# Patient Record
Sex: FEMALE | Race: WHITE | NOT HISPANIC OR LATINO | Employment: OTHER | URBAN - METROPOLITAN AREA
[De-identification: names, ages, dates, MRNs, and addresses within clinical notes are randomized per-mention and may not be internally consistent; named-entity substitution may affect disease eponyms.]

---

## 2017-01-12 ENCOUNTER — OFFICE VISIT (OUTPATIENT)
Dept: INTERNAL MEDICINE | Facility: CLINIC | Age: 82
End: 2017-01-12

## 2017-01-12 VITALS
DIASTOLIC BLOOD PRESSURE: 80 MMHG | TEMPERATURE: 98.7 F | WEIGHT: 209 LBS | HEIGHT: 68 IN | BODY MASS INDEX: 31.67 KG/M2 | SYSTOLIC BLOOD PRESSURE: 120 MMHG

## 2017-01-12 DIAGNOSIS — K76.89 LIVER CYST: Primary | ICD-10-CM

## 2017-01-12 DIAGNOSIS — F41.1 ANXIETY STATE: ICD-10-CM

## 2017-01-12 DIAGNOSIS — E78.5 HYPERLIPIDEMIA, UNSPECIFIED HYPERLIPIDEMIA TYPE: ICD-10-CM

## 2017-01-12 DIAGNOSIS — M79.606 PAIN OF LOWER EXTREMITY, UNSPECIFIED LATERALITY: ICD-10-CM

## 2017-01-12 DIAGNOSIS — G89.29 CHRONIC LEFT SHOULDER PAIN: ICD-10-CM

## 2017-01-12 DIAGNOSIS — Z13.820 ENCOUNTER FOR SCREENING FOR OSTEOPOROSIS: ICD-10-CM

## 2017-01-12 DIAGNOSIS — M25.512 CHRONIC LEFT SHOULDER PAIN: ICD-10-CM

## 2017-01-12 DIAGNOSIS — I10 BENIGN ESSENTIAL HYPERTENSION: ICD-10-CM

## 2017-01-12 DIAGNOSIS — M25.50 ARTHRALGIA, UNSPECIFIED JOINT: ICD-10-CM

## 2017-01-12 PROCEDURE — 99214 OFFICE O/P EST MOD 30 MIN: CPT | Performed by: NURSE PRACTITIONER

## 2017-01-12 RX ORDER — HYDROCODONE BITARTRATE AND ACETAMINOPHEN 7.5; 325 MG/1; MG/1
1 TABLET ORAL 3 TIMES DAILY PRN
Qty: 90 TABLET | Refills: 0 | Status: SHIPPED | OUTPATIENT
Start: 2017-01-12

## 2017-01-12 RX ORDER — VENLAFAXINE HYDROCHLORIDE 150 MG/1
CAPSULE, EXTENDED RELEASE ORAL
Qty: 90 CAPSULE | Refills: 1 | Status: SHIPPED | OUTPATIENT
Start: 2017-01-12

## 2017-01-12 RX ORDER — IBANDRONATE SODIUM 150 MG/1
150 TABLET, FILM COATED ORAL
Qty: 4 TABLET | Refills: 1 | Status: SHIPPED | OUTPATIENT
Start: 2017-01-12

## 2017-01-12 RX ORDER — PRAVASTATIN SODIUM 40 MG
40 TABLET ORAL DAILY
Qty: 30 TABLET | Refills: 5 | Status: SHIPPED | OUTPATIENT
Start: 2017-01-12 | End: 2017-01-12 | Stop reason: SDUPTHER

## 2017-01-12 RX ORDER — VENLAFAXINE HYDROCHLORIDE 150 MG/1
150 CAPSULE, EXTENDED RELEASE ORAL DAILY
Qty: 30 CAPSULE | Refills: 5 | Status: SHIPPED | OUTPATIENT
Start: 2017-01-12 | End: 2017-01-12 | Stop reason: SDUPTHER

## 2017-01-12 RX ORDER — PRAVASTATIN SODIUM 40 MG
TABLET ORAL
Qty: 90 TABLET | Refills: 1 | Status: SHIPPED | OUTPATIENT
Start: 2017-01-12
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
## 2017-01-12 NOTE — MR AVS SNAPSHOT
Gabriella Palmer   1/12/2017 1:00 PM   Office Visit    Dept Phone:  [phone]   Encounter #:  [id number]    Provider:  FRANCE Herndon   Department:  [facility] INTERNAL MEDICINE                Your Full Care Plan              Today's Medication Changes          These changes are accurate as of: 1/12/17  2:15 PM.  If you have any questions, ask your nurse or doctor.               New Medication(s)Ordered:     pravastatin 40 MG tablet   Commonly known as:  PRAVACHOL   Take 1 tablet by mouth Daily.   Started by:  FRANCE Herndon         Medication(s)that have changed:     HYDROcodone-acetaminophen 7.5-325 MG per tablet   Commonly known as:  NORCO   Take 1 tablet by mouth 3 (Three) Times a Day As Needed for moderate pain (4-6).   What changed:  See the new instructions.   Changed by:  FRANCE Herndon            Where to Get Your Medications      These medications were sent to [facility] [address] [place] [address] [address] AT [facility] [phone] [facility] [phone]   [address] [facility] [address]     Phone:  [phone]     ibandronate 150 MG tablet    pravastatin 40 MG tablet    venlafaxine  MG 24 hr capsule         You can get these medications from any pharmacy     Bring a paper prescription for each of these medications     HYDROcodone-acetaminophen 7.5-325 MG per tablet                  Your Updated Medication List          This list is accurate as of: 1/12/17  2:15 PM.  Always use your most recent med list.                clonazePAM 0.5 MG tablet   Commonly known as:  KlonoPIN   TAKE 1 TABLET BY MOUTH EVERY MORNING AND THEN TAKE 2 TABLETS BY MOUTH EVERY EVENING AS NEEDED       DILT- MG 24 hr capsule   Generic drug:  diltiazem XR       HYDROcodone-acetaminophen 7.5-325 MG per tablet   Commonly known as:  NORCO   Take 1 tablet by mouth 3 (Three) Times a Day As Needed  for moderate pain (4-6).       ibandronate 150 MG tablet   Commonly known as:  BONIVA   Take 1 tablet by mouth Every 30 (Thirty) Days.       metoprolol tartrate 25 MG tablet   Commonly known as:  LOPRESSOR       MULTI VITAMIN DAILY PO       pravastatin 40 MG tablet   Commonly known as:  PRAVACHOL   Take 1 tablet by mouth Daily.       rOPINIRole 2 MG tablet   Commonly known as:  REQUIP   TAKE 1 TABLET BY MOUTH EVERY NIGHT       venlafaxine  MG 24 hr capsule   Commonly known as:  EFFEXOR-XR   Take 1 capsule by mouth Daily.       Vitamin D3 2000 UNITS tablet               We Performed the Following     DEXA Bone Density Axial     MRI abdomen w wo contrast       You Were Diagnosed With        Codes Comments    Liver cyst    -  Primary ICD-10-CM: K76.89  ICD-9-CM: 573.8     Benign essential hypertension     ICD-10-CM: I10  ICD-9-CM: 401.1     Arthralgia, unspecified joint     ICD-10-CM: M25.50  ICD-9-CM: 719.40     Pain of lower extremity, unspecified laterality     ICD-10-CM: M79.606  ICD-9-CM: 729.5     Chronic left shoulder pain     ICD-10-CM: M25.512, G89.29  ICD-9-CM: 719.41, 338.29     Anxiety state     ICD-10-CM: F41.1  ICD-9-CM: 300.00     Hyperlipidemia, unspecified hyperlipidemia type     ICD-10-CM: E78.5  ICD-9-CM: 272.4     Encounter for screening for osteoporosis     ICD-10-CM: Z13.820  ICD-9-CM: V82.81       Instructions     None    Patient Instructions History      Upcoming Appointments     Visit Type Date Time Department    FOLLOW UP 1/12/2017  1:00 PM [address]      [employer] Signup     Our records indicate that you have declined [employer] signup. If you would like to sign up for [employer], please email [email or web address] or call [phone] to obtain an activation code.             Other Info from Your Visit           Allergies     Morphine Sulfate      Ampicillin      Crestor [Rosuvastatin]      Demerol [Meperidine]      Lipitor [Atorvastatin]      Phenobarbital    "     Reason for Visit     3 month follow up     Hypertension     Hyperlipidemia     Daytime Sleepiness Discuss stopping any medication that could be making her sleepy. She sleep all the time.       Vital Signs     Blood Pressure Temperature Height Weight Body Mass Index Smoking Status    120/80 98.7 °F (37.1 °C) (Temporal Artery ) 68\" (172.7 cm) 209 lb (94.8 kg) 31.78 kg/m2 Never Smoker      Problems and Diagnoses Noted     Anxiety state    Benign essential hypertension    Chronic left shoulder pain    High cholesterol or triglycerides    Liver cyst    Joint pain    Leg pain    Screening for osteoporosis          No Longer an Issue     Cough        "

## 2017-01-23 ENCOUNTER — HOSPITAL ENCOUNTER (OUTPATIENT)
Dept: MRI IMAGING | Facility: HOSPITAL | Age: 82
Discharge: HOME | End: 2017-01-23
Admitting: NURSE PRACTITIONER

## 2017-01-23 LAB — CREAT BLDA-MCNC: 1 MG/DL (ref 0.6–1.3)

## 2017-01-23 PROCEDURE — 82565 ASSAY OF CREATININE: CPT

## 2017-01-23 PROCEDURE — A9577 INJ MULTIHANCE: HCPCS | Performed by: NURSE PRACTITIONER

## 2017-01-23 PROCEDURE — 0 GADOBENATE DIMEGLUMINE 529 MG/ML SOLUTION: Performed by: NURSE PRACTITIONER

## 2017-01-23 PROCEDURE — 74183 MRI ABD W/O CNTR FLWD CNTR: CPT

## 2017-01-23 RX ADMIN — GADOBENATE DIMEGLUMINE 20 ML: 529 INJECTION, SOLUTION INTRAVENOUS at 14:00

## 2017-01-24 ENCOUNTER — HOSPITAL ENCOUNTER (OUTPATIENT)
Dept: BONE DENSITY | Facility: HOSPITAL | Age: 82
Discharge: HOME | End: 2017-01-24
Admitting: NURSE PRACTITIONER

## 2017-01-24 PROCEDURE — 77080 DXA BONE DENSITY AXIAL: CPT

## 2017-01-24 PROCEDURE — 77080 DXA BONE DENSITY AXIAL: CPT | Performed by: RADIOLOGY

## 2017-01-25 ENCOUNTER — TELEPHONE (OUTPATIENT)
Dept: INTERNAL MEDICINE | Facility: CLINIC | Age: 82
End: 2017-01-25

## 2017-01-25 NOTE — TELEPHONE ENCOUNTER
----- Message from FRANCE Herndon sent at 1/24/2017  9:17 AM EST -----  MRI of abdomen shows a benign/simple cyst in the liver.  Nothing else to do unless she starts to have abdominal pain/bloating, etc.

## 2017-01-27 ENCOUNTER — TELEPHONE (OUTPATIENT)
Dept: INTERNAL MEDICINE | Facility: CLINIC | Age: 82
End: 2017-01-27

## 2017-01-27 RX ORDER — ROPINIROLE 2 MG/1
TABLET, FILM COATED ORAL
Qty: 90 TABLET | Refills: 0 | Status: SHIPPED | OUTPATIENT
Start: 2017-01-27 | End: 2017-06-12 | Stop reason: SDUPTHER

## 2017-01-27 NOTE — TELEPHONE ENCOUNTER
----- Message from Gloria Alaniz sent at 1/27/2017 11:21 AM EST -----  PT CALLED [facility] TO KNOW SHE HAS BEEN SLEEPING EVERY OTHER NIGHT.

## 2017-01-27 NOTE — TELEPHONE ENCOUNTER
Olga called back and Chanell gave her the results msg and told her that she is doing okay, but she also wanted to let you know that she is only sleeping every other night.

## 2017-02-02 ENCOUNTER — TELEPHONE (OUTPATIENT)
Dept: INTERNAL MEDICINE | Facility: CLINIC | Age: 82
End: 2017-02-02

## 2017-02-02 NOTE — TELEPHONE ENCOUNTER
----- Message from Gloria Alaniz sent at 2/2/2017 11:02 AM EST -----  PLEASE GIVE PT A CALL ABOUT INS PAPERWORK SHE BROUGHT IN WANTS TO KNOW WHAT SHE NEEDS TO PAY.

## 2017-02-02 NOTE — TELEPHONE ENCOUNTER
I have called Olga to let her know that I have left a msg to the [facility] billing department and asked them to please call me back in regards to her bill.

## 2017-02-02 NOTE — TELEPHONE ENCOUNTER
Have called the billing department at [phone] [facility] and left them a msg for them to call me back in regards to patient bill for her oxygen and to see what we need to do or can do to get her oxygen paid for. Pt's account # is [id number].

## 2017-02-02 NOTE — TELEPHONE ENCOUNTER
I have spoken with Olga and she does want to continue to have her oxygen on hand in case she needs it. I told her I will call  The billing office at [facility] and check to see what all they need from us as far as a face to face visit.

## 2017-02-08 NOTE — TELEPHONE ENCOUNTER
Left calli a msg letting her know that I spoke with Willa at [facility] and they do need us to do a face to face with her for her oxygen and submitted those records to them so they can get that run through her insurance which would help get her oxygen paid for. I told her to call the office back to schedule appt and of course call me back if she has any questions.

## 2017-02-09 ENCOUNTER — OFFICE VISIT (OUTPATIENT)
Dept: INTERNAL MEDICINE | Facility: CLINIC | Age: 82
End: 2017-02-09

## 2017-02-09 VITALS
WEIGHT: 212 LBS | DIASTOLIC BLOOD PRESSURE: 80 MMHG | BODY MASS INDEX: 32.13 KG/M2 | TEMPERATURE: 97.6 F | HEIGHT: 68 IN | HEART RATE: 94 BPM | SYSTOLIC BLOOD PRESSURE: 120 MMHG | OXYGEN SATURATION: 97 %

## 2017-02-09 DIAGNOSIS — R06.89 DYSPNEA AND RESPIRATORY ABNORMALITIES: ICD-10-CM

## 2017-02-09 DIAGNOSIS — J44.9 CHRONIC OBSTRUCTIVE PULMONARY DISEASE, UNSPECIFIED COPD TYPE (HCC): ICD-10-CM

## 2017-02-09 DIAGNOSIS — R06.00 DYSPNEA AND RESPIRATORY ABNORMALITIES: ICD-10-CM

## 2017-02-09 DIAGNOSIS — Z79.899 ENCOUNTER FOR LONG-TERM (CURRENT) USE OF MEDICATIONS: Primary | ICD-10-CM

## 2017-02-09 PROCEDURE — 99214 OFFICE O/P EST MOD 30 MIN: CPT | Performed by: NURSE PRACTITIONER

## 2017-02-09 RX ORDER — DILTIAZEM HYDROCHLORIDE 300 MG/1
CAPSULE, EXTENDED RELEASE ORAL
COMMUNITY
Start: 2017-01-24
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
## 2017-03-15 ENCOUNTER — TELEPHONE (OUTPATIENT)
Dept: INTERNAL MEDICINE | Facility: CLINIC | Age: 82
End: 2017-03-15

## 2017-03-15 NOTE — TELEPHONE ENCOUNTER
----- Message from Holly Meade sent at 3/15/2017 12:01 PM EDT -----  Contact: patient  Would like a call back

## 2017-03-21 ENCOUNTER — TRANSCRIBE ORDERS (OUTPATIENT)
Dept: ADMINISTRATIVE | Facility: HOSPITAL | Age: 82
End: 2017-03-21

## 2017-03-21 DIAGNOSIS — Z12.31 VISIT FOR SCREENING MAMMOGRAM: Primary | ICD-10-CM

## 2017-03-22 ENCOUNTER — TELEPHONE (OUTPATIENT)
Dept: INTERNAL MEDICINE | Facility: CLINIC | Age: 82
End: 2017-03-22

## 2017-03-22 NOTE — TELEPHONE ENCOUNTER
We have the bill she dropped off for her Oxygen. Im still waiting for Carlee to decided how to proceed with an office note or letter sent to company in order for them to pay for her oxygen.

## 2017-03-22 NOTE — TELEPHONE ENCOUNTER
Spoke with Olga and she said that she can barley breath even with her oxygen and can't catch her breath. Having a lot of difficulty breathing and not feeling well at all. I told her if she is having this much difficulty breathing and feeling that bad then she needs to go ahead to the ER to be evaluated. She said she would go on to the ER and usually goes to [facility].

## 2017-03-23 ENCOUNTER — TELEPHONE (OUTPATIENT)
Dept: INTERNAL MEDICINE | Facility: CLINIC | Age: 82
End: 2017-03-23

## 2017-03-23 NOTE — TELEPHONE ENCOUNTER
I have left patient a msg letting her know that She needs to come back in to see Carlee for a face to face and walk test in order for her to get something sent over to the Oxygen company in order to help get her oxygen covered. I told her I was also calling to check in her since I did send her to the ER yesterday for breathing problems. I told her when she gets my msg and a chance to please call the office to schedule an appt.

## 2017-04-07 ENCOUNTER — TELEPHONE (OUTPATIENT)
Dept: INTERNAL MEDICINE | Facility: CLINIC | Age: 82
End: 2017-04-07

## 2017-04-07 NOTE — TELEPHONE ENCOUNTER
Spoke with Olga and let her know that according to Carlee last note in February that she verbalized the understanding that Carlee was not going to write for any pain medication from that point on. She said yes, she knows. I told her to make sure she keeps her appt with you on 4/13/17 next to follow up on everything.

## 2017-04-07 NOTE — TELEPHONE ENCOUNTER
"----- Message from Lary Rivera sent at 4/7/2017  1:31 PM EDT -----  Patient called to ask for an rx for hydrocodone because her shoulder hurts. I let her know that I was sending the message back, but that Carlee is out of the office this week so she wouldn't be able to address this until Monday at the earliest. I also advised her to seek care at the [facility] next door to us if she felt that she needed to be treated acutely. She told me, \"someone else over there is just going to have to write this prescription, then.\"   Again, I explained that Carlee is out of the office this week, but that she will see this message once she returns. I advised her to seek care at [facility] again. Patient stated that she would \"just figure it out\" and hung up. Please advise. Thanks  "

## 2017-04-10 ENCOUNTER — TELEPHONE (OUTPATIENT)
Dept: INTERNAL MEDICINE | Facility: CLINIC | Age: 82
End: 2017-04-10

## 2017-04-10 NOTE — TELEPHONE ENCOUNTER
"----- Message from Lary Rivera sent at 4/10/2017  1:36 PM EDT -----  Patient called back to ask about getting pain pills. I asked her if she spoke to you on Friday and she said \"NO. No one has called me at all and I need something for my shoulder.\" I then read the message chain to her that says you did discussed this with her on Friday and she expressed understanding that Carlee has already told her she would not write any more controls for her. Per Carlee's note, I told her that we could send her to pain management, but she refused stating \"If Carlee can't do this for me, then I'll call a doctor.\" Just fyi.  "

## 2017-04-10 NOTE — TELEPHONE ENCOUNTER
I will not write for any controlled pain meds.  We discussed this at her last visit due to the instability of her environment.  I can refer her to pain management if she would like?

## 2017-04-10 NOTE — TELEPHONE ENCOUNTER
----- Message from Gloria Alaniz sent at 4/10/2017 11:43 AM EDT -----  PLEASE GIVE PT A CALL SHE WANTS SOME PAIN MEDICINE HER SHOULDER IS KILLING HER SHE SAID IF YOU WONT SHE WILL CALL THE SURGEON. SHE STATES SHE WILL HID IT GOOD SO NOBOBODY WILL FIND IT.

## 2017-04-10 NOTE — TELEPHONE ENCOUNTER
Left Olga a msg letting her know that i received both msg's today and that we spoke on Friday regarding this issue and that Carlee is not going to be writing for this medication, but she did say that she would refer to pain management if she would like and to give me a call back regarding this and if she would like to proceed with a referral to Pain management.

## 2017-04-13 ENCOUNTER — OFFICE VISIT (OUTPATIENT)
Dept: INTERNAL MEDICINE | Facility: CLINIC | Age: 82
End: 2017-04-13

## 2017-04-13 VITALS
BODY MASS INDEX: 32.54 KG/M2 | DIASTOLIC BLOOD PRESSURE: 62 MMHG | HEART RATE: 80 BPM | RESPIRATION RATE: 22 BRPM | SYSTOLIC BLOOD PRESSURE: 138 MMHG | OXYGEN SATURATION: 96 % | TEMPERATURE: 97.9 F | WEIGHT: 214 LBS

## 2017-04-13 DIAGNOSIS — J44.9 CHRONIC OBSTRUCTIVE PULMONARY DISEASE, UNSPECIFIED COPD TYPE (HCC): Primary | ICD-10-CM

## 2017-04-13 DIAGNOSIS — R06.00 DYSPNEA AND RESPIRATORY ABNORMALITIES: ICD-10-CM

## 2017-04-13 DIAGNOSIS — R06.89 DYSPNEA AND RESPIRATORY ABNORMALITIES: ICD-10-CM

## 2017-04-13 PROCEDURE — 94620 PR PULMONARY STRESS TESTING,SIMPLE: CPT | Performed by: NURSE PRACTITIONER

## 2017-04-13 PROCEDURE — 99214 OFFICE O/P EST MOD 30 MIN: CPT | Performed by: NURSE PRACTITIONER

## 2017-04-13 RX ORDER — CLONIDINE HYDROCHLORIDE 0.1 MG/1
TABLET ORAL
Refills: 5 | COMMUNITY
Start: 2017-04-03

## 2017-04-13 RX ORDER — ATORVASTATIN CALCIUM 80 MG/1
TABLET, FILM COATED ORAL
Refills: 0 | COMMUNITY
Start: 2017-03-24

## 2017-04-13 RX ORDER — LISINOPRIL 2.5 MG/1
TABLET ORAL
Refills: 0 | COMMUNITY
Start: 2017-03-24 | End: 2017-05-22

## 2017-04-13 NOTE — PROGRESS NOTES
Subjective   Gabriella Palmer is a 83 y.o. female    Chief Complaint   Patient presents with   • Follow-up     3 mo follow up regarding oxygen     History of Present Illness     COPD- Here today for face to face for need of oxygen. Has has oxygen at home for last couple of years. wears Oxygen at night at 2.5 L/m via NC and occassionally during the day if she feels short of breath. Has to do during th day about 3 times a week and has to wear it all day to get the SOA to resolve.  Has shortness of breath at rest and with exertion.      HTN- Currently taking  Metoprolol 25 mg BID, cartia 300, lisinopril 2.5mg, and clonidine 0.1 mg 1/2 tab twice a day. Saw cardiology  Recently and they made some adjustments on her medications, but she cannot remember what current meds or doses are.      The following portions of the patient's history were reviewed and updated as appropriate: allergies, current medications, past family history, past medical history, past social history, past surgical history and problem list.    Current Outpatient Prescriptions:   •  atorvastatin (LIPITOR) 80 MG tablet, TK 1 T PO HS, Disp: , Rfl: 0  •  CARTIA  MG 24 hr capsule, , Disp: , Rfl:   •  Cholecalciferol (VITAMIN D3) 2000 UNITS tablet, Take  by mouth Daily., Disp: , Rfl:   •  clonazePAM (KlonoPIN) 0.5 MG tablet, TAKE 1 TABLET BY MOUTH EVERY MORNING AND THEN TAKE 2 TABLETS BY MOUTH EVERY EVENING AS NEEDED, Disp: 90 tablet, Rfl: 0  •  CloNIDine (CATAPRES) 0.1 MG tablet, TK 1/2 T PO Q 8 H, Disp: , Rfl: 5  •  DILT- MG 24 hr capsule, , Disp: , Rfl:   •  HYDROcodone-acetaminophen (NORCO) 7.5-325 MG per tablet, Take 1 tablet by mouth 3 (Three) Times a Day As Needed for moderate pain (4-6)., Disp: 90 tablet, Rfl: 0  •  ibandronate (BONIVA) 150 MG tablet, Take 1 tablet by mouth Every 30 (Thirty) Days., Disp: 4 tablet, Rfl: 1  •  lisinopril (PRINIVIL,ZESTRIL) 2.5 MG tablet, TK 1 T PO D, Disp: , Rfl: 0  •  metoprolol tartrate (LOPRESSOR) 25 MG  tablet, Take  by mouth 2 (Two) Times a Day., Disp: , Rfl:   •  Multiple Vitamin (MULTI VITAMIN DAILY PO), Take  by mouth Daily., Disp: , Rfl:   •  pravastatin (PRAVACHOL) 40 MG tablet, TAKE 1 TABLET BY MOUTH DAILY, Disp: 90 tablet, Rfl: 1  •  rOPINIRole (REQUIP) 2 MG tablet, TAKE 1 TABLET BY MOUTH EVERY NIGHT, Disp: 90 tablet, Rfl: 0  •  venlafaxine XR (EFFEXOR-XR) 150 MG 24 hr capsule, TAKE 1 CAPSULE BY MOUTH DAILY, Disp: 90 capsule, Rfl: 1     Review of Systems   Constitutional: Negative for chills, fatigue and fever.   Respiratory: Positive for shortness of breath. Negative for cough and chest tightness.    Cardiovascular: Negative for chest pain.   Gastrointestinal: Negative for abdominal pain, diarrhea, nausea and vomiting.   Endocrine: Negative for cold intolerance and heat intolerance.   Musculoskeletal: Negative for arthralgias.   Neurological: Negative for dizziness.       Objective   Physical Exam   Constitutional: She is oriented to person, place, and time. She appears well-developed and well-nourished.   HENT:   Head: Normocephalic and atraumatic.   Eyes: Conjunctivae and EOM are normal. Pupils are equal, round, and reactive to light.   Neck: Normal range of motion.   Cardiovascular: Normal rate, regular rhythm and normal heart sounds.    Pulmonary/Chest: Effort normal and breath sounds normal. No accessory muscle usage. No tachypnea. No respiratory distress.   Abdominal: Soft. Bowel sounds are normal.   Musculoskeletal: Normal range of motion.   Neurological: She is alert and oriented to person, place, and time. She has normal reflexes.   Skin: Skin is warm and dry.   Psychiatric: She has a normal mood and affect. Her behavior is normal. Judgment and thought content normal.     Vitals:    04/13/17 1251 04/13/17 1351   BP: 162/82 138/62   Pulse: 80    Resp: 22    Temp: 97.9 °F (36.6 °C)    TempSrc: Temporal Artery     SpO2: 96%    Weight: 214 lb (97.1 kg)      6 minute walk test performed. Resting Sp02  96% on RA, down to 90% during ambulation, back up to 96 % at 1 min past resting. C/O significant SOA during ambulation.     Assessment/Plan   Gabriella was seen today for follow-up.    Diagnoses and all orders for this visit:    Chronic obstructive pulmonary disease, unspecified COPD type    Dyspnea and respiratory abnormalities      No medications changes today  Continue oxygen at night and PRN SOA at 2-3 L /m per NC   RTC PRN or with worsening of sx's          Scribed for FRANCE Montes by FRANCE Read Student. 4/13/2017  1:51 PM    I, FRANCE Montes, personally performed the services described in this documentation as scribed by the above named individual in my presence, and it is both accurate and complete.  4/13/2017  5:10 PM    FRANCE Acevedo

## 2017-04-24 ENCOUNTER — TELEPHONE (OUTPATIENT)
Dept: INTERNAL MEDICINE | Facility: CLINIC | Age: 82
End: 2017-04-24

## 2017-04-24 NOTE — TELEPHONE ENCOUNTER
Spoke with Olga to let her know that I have faxed the face to face office note to [facility], so they will be able to submit claim through patients insurance for her oxygen.  Fax # to [facility] is [phone]

## 2017-04-24 NOTE — TELEPHONE ENCOUNTER
----- Message from Gloria Alaniz sent at 4/20/2017 10:08 AM EDT -----  Please call pt she is wondering if she jens has heard for [facility] i told pt last week when she brought the bill that it would not be paid for because she missed appt.

## 2017-05-01 ENCOUNTER — APPOINTMENT (OUTPATIENT)
Dept: MAMMOGRAPHY | Facility: HOSPITAL | Age: 82
End: 2017-05-01
Attending: SURGERY

## 2017-05-22 ENCOUNTER — OFFICE VISIT (OUTPATIENT)
Dept: INTERNAL MEDICINE | Facility: CLINIC | Age: 82
End: 2017-05-22

## 2017-05-22 VITALS
HEIGHT: 68 IN | TEMPERATURE: 98.3 F | WEIGHT: 217 LBS | SYSTOLIC BLOOD PRESSURE: 144 MMHG | DIASTOLIC BLOOD PRESSURE: 78 MMHG | BODY MASS INDEX: 32.89 KG/M2

## 2017-05-22 DIAGNOSIS — M25.512 CHRONIC LEFT SHOULDER PAIN: ICD-10-CM

## 2017-05-22 DIAGNOSIS — I10 BENIGN ESSENTIAL HYPERTENSION: Primary | ICD-10-CM

## 2017-05-22 DIAGNOSIS — R60.0 BILATERAL EDEMA OF LOWER EXTREMITY: ICD-10-CM

## 2017-05-22 DIAGNOSIS — G89.29 CHRONIC LEFT SHOULDER PAIN: ICD-10-CM

## 2017-05-22 DIAGNOSIS — J44.9 CHRONIC OBSTRUCTIVE PULMONARY DISEASE, UNSPECIFIED COPD TYPE (HCC): ICD-10-CM

## 2017-05-22 DIAGNOSIS — Z86.73 HISTORY OF CVA (CEREBROVASCULAR ACCIDENT): ICD-10-CM

## 2017-05-22 DIAGNOSIS — F41.9 ANXIETY DISORDER, UNSPECIFIED TYPE: ICD-10-CM

## 2017-05-22 PROCEDURE — 99204 OFFICE O/P NEW MOD 45 MIN: CPT | Performed by: INTERNAL MEDICINE

## 2017-06-12 RX ORDER — ROPINIROLE 2 MG/1
TABLET, FILM COATED ORAL
Qty: 90 TABLET | Refills: 0 | Status: SHIPPED | OUTPATIENT
Start: 2017-06-12

## 2017-06-13 RX ORDER — CLONAZEPAM 0.5 MG/1
TABLET ORAL
Qty: 90 TABLET | Refills: 0 | OUTPATIENT
Start: 2017-06-13

## 2018-05-21 ENCOUNTER — EPISODE CHANGES (OUTPATIENT)
Dept: CASE MANAGEMENT | Facility: OTHER | Age: 83
End: 2018-05-21

## 2018-06-04 ENCOUNTER — EPISODE CHANGES (OUTPATIENT)
Dept: CASE MANAGEMENT | Facility: OTHER | Age: 83
End: 2018-06-04

## 2018-06-06 ENCOUNTER — EPISODE CHANGES (OUTPATIENT)
Dept: CASE MANAGEMENT | Facility: OTHER | Age: 83
End: 2018-06-06

## 2018-06-08 ENCOUNTER — EPISODE CHANGES (OUTPATIENT)
Dept: CASE MANAGEMENT | Facility: OTHER | Age: 83
End: 2018-06-08

## 2018-06-11 ENCOUNTER — EPISODE CHANGES (OUTPATIENT)
Dept: CASE MANAGEMENT | Facility: OTHER | Age: 83
End: 2018-06-11

## 2018-10-18 ENCOUNTER — EPISODE CHANGES (OUTPATIENT)
Dept: CASE MANAGEMENT | Facility: OTHER | Age: 83
End: 2018-10-18